# Patient Record
Sex: FEMALE | Race: WHITE | Employment: FULL TIME | URBAN - METROPOLITAN AREA
[De-identification: names, ages, dates, MRNs, and addresses within clinical notes are randomized per-mention and may not be internally consistent; named-entity substitution may affect disease eponyms.]

---

## 2017-04-07 ENCOUNTER — APPOINTMENT (OUTPATIENT)
Dept: GENERAL RADIOLOGY | Age: 52
End: 2017-04-07
Attending: EMERGENCY MEDICINE
Payer: COMMERCIAL

## 2017-04-07 ENCOUNTER — HOSPITAL ENCOUNTER (EMERGENCY)
Age: 52
Discharge: HOME | End: 2017-04-07
Attending: EMERGENCY MEDICINE
Payer: COMMERCIAL

## 2017-04-07 VITALS
SYSTOLIC BLOOD PRESSURE: 133 MMHG | HEIGHT: 67 IN | OXYGEN SATURATION: 98 % | RESPIRATION RATE: 18 BRPM | BODY MASS INDEX: 31.23 KG/M2 | WEIGHT: 199 LBS | DIASTOLIC BLOOD PRESSURE: 87 MMHG | HEART RATE: 72 BPM | TEMPERATURE: 97.5 F

## 2017-04-07 DIAGNOSIS — S16.1XXA NECK STRAIN, INITIAL ENCOUNTER: ICD-10-CM
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
## 2017-04-07 DIAGNOSIS — M54.6 ACUTE THORACIC BACK PAIN, UNSPECIFIED BACK PAIN LATERALITY: ICD-10-CM

## 2017-04-07 PROCEDURE — 74011250637 HC RX REV CODE- 250/637: Performed by: EMERGENCY MEDICINE

## 2017-04-07 PROCEDURE — 72050 X-RAY EXAM NECK SPINE 4/5VWS: CPT

## 2017-04-07 PROCEDURE — 99283 EMERGENCY DEPT VISIT LOW MDM: CPT

## 2017-04-07 PROCEDURE — 72072 X-RAY EXAM THORAC SPINE 3VWS: CPT

## 2017-04-07 RX ORDER — TRAMADOL HYDROCHLORIDE 50 MG/1
50 TABLET ORAL
Qty: 10 TAB | Refills: 0 | Status: SHIPPED | OUTPATIENT
Start: 2017-04-07 | End: 2017-04-17

## 2017-04-07 RX ORDER — NAPROXEN 500 MG/1
500 TABLET ORAL
Qty: 20 TAB | Refills: 0 | Status: SHIPPED | OUTPATIENT
Start: 2017-04-07

## 2017-04-07 RX ORDER — IBUPROFEN 600 MG/1
600 TABLET ORAL
Status: COMPLETED | OUTPATIENT
Start: 2017-04-07 | End: 2017-04-07

## 2017-04-07 RX ADMIN — IBUPROFEN 600 MG: 600 TABLET, FILM COATED ORAL at 16:07
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
## 2017-04-07 NOTE — ED NOTES
Patient discharged home after receiving discharge instructions from MD/PA. Patient and daughter voiced understanding and doesn't have any questions at this time. Patient in no distress at this time.
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
## 2017-07-11 ENCOUNTER — OFFICE VISIT (OUTPATIENT)
Dept: CARDIOLOGY CLINIC | Age: 52
End: 2017-07-11

## 2017-07-11 VITALS
OXYGEN SATURATION: 98 % | WEIGHT: 196.8 LBS | RESPIRATION RATE: 16 BRPM | BODY MASS INDEX: 30.89 KG/M2 | DIASTOLIC BLOOD PRESSURE: 82 MMHG | HEIGHT: 67 IN | SYSTOLIC BLOOD PRESSURE: 124 MMHG | HEART RATE: 87 BPM

## 2017-07-11 DIAGNOSIS — I83.893 VARICOSE VEINS OF BOTH LEGS WITH EDEMA: ICD-10-CM

## 2017-07-11 DIAGNOSIS — I83.813 VARICOSE VEINS OF BOTH LOWER EXTREMITIES WITH PAIN: Primary | ICD-10-CM

## 2017-07-11 RX ORDER — ATORVASTATIN CALCIUM 10 MG/1
TABLET, FILM COATED ORAL
Refills: 3 | COMMUNITY
Start: 2017-04-12

## 2017-07-11 RX ORDER — DICLOFENAC SODIUM 75 MG/1
TABLET, DELAYED RELEASE ORAL
Refills: 0 | COMMUNITY
Start: 2017-04-13

## 2017-07-11 RX ORDER — CLOBETASOL PROPIONATE 0.5 MG/G
CREAM TOPICAL
Refills: 0 | COMMUNITY
Start: 2017-05-08

## 2017-07-11 NOTE — PROGRESS NOTES
Tanner Santiago DNP, ANP-BC  Subjective/HPI: VASCULAR CONSULT    Dk Miles is a 45 y.o. female is here for follow-up for bilateral significant lower extremity varicosities, swelling and tenderness. Patient underwent a reflux study last  with significant bilateral GS V. She has been wearing thigh-high prescription compression stockings on and off however not on a continual basis. She has noted some petechiae in the lower extremities seen by dermatology and was referred back to our practice. 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 There is a family history of leg varicosities. n    PCP Provider  Lina Jerry MD  Past Medical History:   Diagnosis Date    Asthma       Past Surgical History:   Procedure Laterality Date    HX  SECTION  25: 25 yrs ago    Ceasarean Secton x2    HX DILATION AND CURETTAGE      D AND C, ABLATION 2 yrs ago    HX HEENT  30 yrs ago    Tonsillectomy    HX HYSTERECTOMY      HX OTHER SURGICAL      HX UROLOGICAL  8 yrs ago    kidney stones     HX UROLOGICAL  8 yrs ago    lithotripsy     Allergies   Allergen Reactions    Megace Rash    Zovia  (21) Rash      Family History   Problem Relation Age of Onset    Arthritis-osteo Mother     Asthma Mother     Cancer Mother     Headache Mother     Alcohol abuse Father     Lung Disease Father     Diabetes Sister       Current Outpatient Prescriptions   Medication Sig    albuterol (PROVENTIL HFA, VENTOLIN HFA) 90 mcg/Actuation inhaler Take 2 Puffs by inhalation every four (4) hours as needed.  clobetasol (TEMOVATE) 0.05 % topical cream APPLY TO AFFECTED AREA TWICE A DAY EXTERNALLY 10 DAY(S)    diclofenac EC (VOLTAREN) 75 mg EC tablet TAKE 1 TABLET BY MOUTH EVERY 12 HOURS WITH FOOD    atorvastatin (LIPITOR) 10 mg tablet TAKE 1 TABLET BY MOUTH ONCE A DAY, AT BEDTIME    naproxen (NAPROSYN) 500 mg tablet Take 1 Tab by mouth every twelve (12) hours as needed for Pain.      No current facility-administered medications for this visit. Vitals:    07/11/17 1354 07/11/17 1401   BP: 124/86 124/82   Pulse: 87    Resp: 16    SpO2: 98%    Weight: 196 lb 12.8 oz (89.3 kg)    Height: 5' 7\" (1.702 m)      Social History     Social History    Marital status:      Spouse name: N/A    Number of children: N/A    Years of education: N/A     Occupational History    Not on file. Social History Main Topics    Smoking status: Never Smoker    Smokeless tobacco: Not on file    Alcohol use No    Drug use: No    Sexual activity: No     Other Topics Concern    Not on file     Social History Narrative       I have reviewed the nurses notes, vitals, problem list, allergy list, medical history, family, social history and medications. Review of Symptoms:    General: Pt denies excessive weight gain or loss. Pt is able to conduct ADL's  HEENT: Denies blurred vision, headaches, epistaxis and difficulty swallowing. Respiratory: Denies shortness of breath, SALDIVAR, wheezing or stridor. Cardiovascular: Denies precordial pain, palpitations, edema or PND  Gastrointestinal: Denies poor appetite, indigestion, abdominal pain or blood in stool  Musculoskeletal: Denies pain or swelling from muscles or joints  Neurologic: Denies tremor, paresthesias, or sensory motor disturbance  Skin: Denies rash, itching or texture change. Physical Exam:      General: Well developed, in no acute distress, cooperative and alert  HEENT: No JVD, trach is midline. Neck Supple, PEERL, EOM intact.   Extremities:  Trace edema, normal cap refill, no cyanosis, atraumatic. Neuro: A&Ox3, speech clear, gait stable. Skin: Skin color is normal. No rashes or lesions. Non diaphoretic  Vascular: 2+ pulses symmetric in all extremities.   Right lower extremity has significant rope like torturous varicosities from the lateral aspect of the right thigh extending to the lateral aspect of the right knee turning towards the posterior right calf. The left lower extremity has ropelike torturous varicosities in the posterior calf. Bilateral ankles have significant spider varicosities, darkening of the skin tone and diffuse petechiae. Cardiographics    ECG:   No results found for this or any previous visit. Cardiology Labs:  No results found for: CHOL, CHOLX, CHLST, [address], [address], HDL, LDL, LDLC, DLDLP, TGLX, [facility], [facility], [facility]    Lab Results   Component Value Date/Time    Sodium 137 01/14/2014 08:17 AM    Potassium 3.4 01/14/2014 08:17 AM    Chloride 102 01/14/2014 08:17 AM    CO2 28 01/14/2014 08:17 AM    Anion gap 7 01/14/2014 08:17 AM    Glucose 111 01/14/2014 08:17 AM    BUN 11 01/14/2014 08:17 AM    Creatinine 0.75 01/14/2014 08:17 AM    BUN/Creatinine ratio 15 01/14/2014 08:17 AM    GFR est AA >60 01/14/2014 08:17 AM    GFR est non-AA >60 01/14/2014 08:17 AM    Calcium 9.6 01/14/2014 08:17 AM    Bilirubin, total 0.6 01/14/2014 08:17 AM    AST (SGOT) 15 01/14/2014 08:17 AM    Alk. phosphatase 102 01/14/2014 08:17 AM    Protein, total 8.2 01/14/2014 08:17 AM    Albumin 4.3 01/14/2014 08:17 AM    Globulin 3.9 01/14/2014 08:17 AM    A-G Ratio 1.1 01/14/2014 08:17 AM    ALT (SGPT) 32 01/14/2014 08:17 AM           Assessment:     Assessment:     Fer Patricio was seen today for leg pain. Diagnoses and all orders for this visit:    Varicose veins of both lower extremities with pain    Varicose veins of both legs with edema      ICD-10-CM ICD-9-CM    1. Varicose veins of both lower extremities with pain I83.813 454.8    2.  Varicose veins of both legs with edema I83.893 454.8      Orders Placed This Encounter    clobetasol (TEMOVATE) 0.05 % topical cream     Sig: APPLY TO AFFECTED AREA TWICE A DAY EXTERNALLY 10 DAY(S)     Refill:  0    diclofenac EC (VOLTAREN) 75 mg EC tablet     Sig: TAKE 1 TABLET BY MOUTH EVERY 12 HOURS WITH FOOD     Refill:  0    atorvastatin (LIPITOR) 10 mg tablet     Sig: TAKE 1 TABLET BY MOUTH ONCE A DAY, AT BEDTIME     Refill:  3        Plan:     Patient is a 79-year-old female with symptomatic significant bilateral refluxing GSV. Discussed with patient initial treatment option will be daily wearing during waking hours of thigh-high bilateral compression stockings 20-30 mmHg for 3 months and follow-up. At times of rest she is to elevate her legs and follow a low-sodium diet. Iliana Loya MD      Pt seen and examined in details. Agree with NP A&P. She never came back after initial evaluation. B/l GSV reflux noted. Symptoms worse now. CEAP class 3. D/w pt. PHYSICIAN TO COMPLETE BELOW THIS POINT    Date of Initial Physician Evaluation:________06/07/2016___________  Check all that apply:  [x] Reviewed Venous history  [x] Physical examination of the affected leg(s)   [x] Duplex or Doppler Scan results reviewed. Duplex or Doppler Ultrasound of the venous system demonstrate:  [x] Absence o f deep venous thrombosis  [x] Greater and/or lesser saphenous vein or  valvular incompetence/reflux that correlates with        patients symptoms  []   valvular incompetence/reflux that correlates with patients symptoms    [x] Graduated, elasticized compression stockings (20-20 mmHg), has been prescribed.   [] Standing Photos taken of leg(s)  [] Front     [] Back     [x] Front and back   [] Other causes of patients leg(s) symptoms have been ruled out           Iliana Loya MD

## 2017-07-11 NOTE — MR AVS SNAPSHOT
Visit Information Date & Time Provider Department Dept. Phone Encounter #  
 7/11/2017  2:00 PM Sue Alvarado, [address] [facility] [phone] [id number] Follow-up Instructions Return in about 3 months (around 10/11/2017). Your Appointments 10/17/2017  3:30 PM  
Vascular Consult with Sue Alvarado MD  
[facility]) Appt Note: CAN ONLY DO LATE APPT'S BC OF WORK  [id number]  
[phone] [id number] [email or web address] [employer] [address] Upcoming Health Maintenance Date Due Hepatitis C Screening 1965 DTaP/Tdap/Td series (1 - Tdap) 10/21/1985 PAP AKA CERVICAL CYTOLOGY 10/21/1985 BREAST CANCER SCRN MAMMOGRAM 10/21/2014 FOBT Q 1 YEAR AGE 50-75 10/21/2014 INFLUENZA AGE 9 TO ADULT 8/1/2017 Allergies as of 7/11/2017  Review Complete On: 7/11/2017 By: Sue Alvarado MD  
  
 Severity Noted Reaction Type Reactions Megace  11/30/2011    Rash  
 Zovia 1/35e (21)  11/30/2011    Rash Current Immunizations  Never Reviewed No immunizations on file. Not reviewed this visit Vitals BP Pulse Resp Height(growth percentile) Weight(growth percentile) LMP  
 124/82 (BP 1 Location: Left arm, BP Patient Position: Sitting) 87 16 5' 7\" (1.702 m) 196 lb 12.8 oz (89.3 kg) 11/25/2011 SpO2 BMI OB Status Smoking Status 98% 30.82 kg/m2 Hysterectomy Never Smoker Vitals History BMI and BSA Data Body Mass Index Body Surface Area  
 30.82 kg/m 2 2.05 m 2 Your Updated Medication List  
  
   
This list is accurate as of: 7/11/17  2:25 PM.  Always use your most recent med list.  
  
  
  
  
 albuterol 90 mcg/actuation inhaler Commonly known as:  PROVENTIL HFA, VENTOLIN HFA, PROAIR HFA Take 2 Puffs by inhalation every four (4) hours as needed. atorvastatin 10 mg tablet Commonly known as:  LIPITOR  
TAKE 1 TABLET BY MOUTH ONCE A DAY, AT BEDTIME  
  
 clobetasol 0.05 % topical cream  
Commonly known as:  TEMOVATE  
APPLY TO AFFECTED AREA TWICE A DAY EXTERNALLY 10 DAY(S)  
  
 diclofenac EC 75 mg EC tablet Commonly known as:  VOLTAREN  
TAKE 1 TABLET BY MOUTH EVERY 12 HOURS WITH FOOD  
  
 naproxen 500 mg tablet Commonly known as:  NAPROSYN Take 1 Tab by mouth every twelve (12) hours as needed for Pain. Follow-up Instructions Return in about 3 months (around 10/11/2017). Introducing [facility]! [facility] introduces [employer] patient portal. Now you can access parts of your medical record, email your doctor's office, and request medication refills online. 1. In your internet browser, go to [email or web address] 2. Click on the First Time User? Click Here link in the Sign In box. You will see the New Member Sign Up page. 3. Enter your [employer] Access Code exactly as it appears below. You will not need to use this code after youve completed the sign-up process. If you do not sign up before the expiration date, you must request a new code. · [employer] Access Code: [email or web address] Expires: 10/9/2017  1:51 PM 
 
4. Enter the last four digits of your Social Security Number (xxxx) and Date of Birth (mm/dd/yyyy) as indicated and click Submit. You will be taken to the next sign-up page. 5. Create a [employer] Track[employer] ID. This will be your [employer] login ID and cannot be changed, so think of one that is secure and easy to remember. 6. Create a [employer] password. You can change your password at any time. 7. Enter your Password Reset Question and Answer. This can be used at a later time if you forget your password. 8. Enter your e-mail address. You will receive e-mail notification when new information is available in [address] 9. Click Sign Up. You can now view and download portions of your medical record. 10. Click the Download Summary menu link to download a portable copy of your medical information. If you have questions, please visit the Frequently Asked Questions section of the [employer] website. Remember, [employer] is NOT to be used for urgent needs. For medical emergencies, dial 911. Now available from your iPhone and Android! Please provide this summary of care documentation to your next provider. Your primary care clinician is listed as Jluis Elias. If you have any questions after today's visit, please call [phone].